# Patient Record
Sex: MALE | Race: ASIAN | NOT HISPANIC OR LATINO | URBAN - METROPOLITAN AREA
[De-identification: names, ages, dates, MRNs, and addresses within clinical notes are randomized per-mention and may not be internally consistent; named-entity substitution may affect disease eponyms.]

---

## 2017-01-13 ENCOUNTER — AMBULATORY - HEALTHEAST (OUTPATIENT)
Dept: LAB | Facility: CLINIC | Age: 32
End: 2017-01-13

## 2017-01-13 DIAGNOSIS — H20.13 CHRONIC UVEITIS OF BOTH EYES: ICD-10-CM

## 2017-01-13 LAB
ALT SERPL W P-5'-P-CCNC: 17 U/L (ref 0–45)
CREAT SERPL-MCNC: 0.75 MG/DL (ref 0.7–1.3)
GFR SERPL CREATININE-BSD FRML MDRD: >60 ML/MIN/1.73M2

## 2017-01-14 ENCOUNTER — COMMUNICATION - HEALTHEAST (OUTPATIENT)
Dept: RHEUMATOLOGY | Facility: CLINIC | Age: 32
End: 2017-01-14

## 2017-01-14 DIAGNOSIS — H20.13 CHRONIC UVEITIS OF BOTH EYES: ICD-10-CM

## 2017-01-17 ENCOUNTER — OFFICE VISIT - HEALTHEAST (OUTPATIENT)
Dept: RHEUMATOLOGY | Facility: CLINIC | Age: 32
End: 2017-01-17

## 2017-01-17 DIAGNOSIS — Z79.899 HIGH RISK MEDICATION USE: ICD-10-CM

## 2017-01-17 DIAGNOSIS — H20.13 CHRONIC UVEITIS OF BOTH EYES: ICD-10-CM

## 2017-01-17 ASSESSMENT — MIFFLIN-ST. JEOR: SCORE: 2141.25

## 2017-02-17 ENCOUNTER — AMBULATORY - HEALTHEAST (OUTPATIENT)
Dept: LAB | Facility: CLINIC | Age: 32
End: 2017-02-17

## 2017-02-17 DIAGNOSIS — H20.13 CHRONIC UVEITIS OF BOTH EYES: ICD-10-CM

## 2017-02-17 LAB
ALT SERPL W P-5'-P-CCNC: 22 U/L (ref 0–45)
CREAT SERPL-MCNC: 0.71 MG/DL (ref 0.7–1.3)
GFR SERPL CREATININE-BSD FRML MDRD: >60 ML/MIN/1.73M2

## 2017-03-17 ENCOUNTER — AMBULATORY - HEALTHEAST (OUTPATIENT)
Dept: LAB | Facility: CLINIC | Age: 32
End: 2017-03-17

## 2017-03-17 ENCOUNTER — COMMUNICATION - HEALTHEAST (OUTPATIENT)
Dept: LAB | Facility: CLINIC | Age: 32
End: 2017-03-17

## 2017-03-17 DIAGNOSIS — H20.13 CHRONIC UVEITIS OF BOTH EYES: ICD-10-CM

## 2017-03-20 LAB
ALT SERPL W P-5'-P-CCNC: 41 U/L (ref 0–45)
CREAT SERPL-MCNC: 0.71 MG/DL (ref 0.7–1.3)
GFR SERPL CREATININE-BSD FRML MDRD: >60 ML/MIN/1.73M2

## 2017-04-07 ENCOUNTER — COMMUNICATION - HEALTHEAST (OUTPATIENT)
Dept: RHEUMATOLOGY | Facility: CLINIC | Age: 32
End: 2017-04-07

## 2017-04-07 DIAGNOSIS — H20.13 CHRONIC UVEITIS OF BOTH EYES: ICD-10-CM

## 2017-04-26 ENCOUNTER — RECORDS - HEALTHEAST (OUTPATIENT)
Dept: ADMINISTRATIVE | Facility: OTHER | Age: 32
End: 2017-04-26

## 2017-05-15 ENCOUNTER — AMBULATORY - HEALTHEAST (OUTPATIENT)
Dept: LAB | Facility: CLINIC | Age: 32
End: 2017-05-15

## 2017-05-15 DIAGNOSIS — H20.13 CHRONIC UVEITIS OF BOTH EYES: ICD-10-CM

## 2017-05-15 LAB
ALT SERPL W P-5'-P-CCNC: 19 U/L (ref 0–45)
CREAT SERPL-MCNC: 0.72 MG/DL (ref 0.7–1.3)
GFR SERPL CREATININE-BSD FRML MDRD: >60 ML/MIN/1.73M2

## 2017-05-17 ENCOUNTER — OFFICE VISIT - HEALTHEAST (OUTPATIENT)
Dept: RHEUMATOLOGY | Facility: CLINIC | Age: 32
End: 2017-05-17

## 2017-05-17 DIAGNOSIS — H20.13 CHRONIC UVEITIS OF BOTH EYES: ICD-10-CM

## 2017-05-17 DIAGNOSIS — Z79.899 HIGH RISK MEDICATION USE: ICD-10-CM

## 2017-06-07 ENCOUNTER — COMMUNICATION - HEALTHEAST (OUTPATIENT)
Dept: RHEUMATOLOGY | Facility: CLINIC | Age: 32
End: 2017-06-07

## 2017-06-07 DIAGNOSIS — H20.13 CHRONIC UVEITIS OF BOTH EYES: ICD-10-CM

## 2017-07-21 ENCOUNTER — AMBULATORY - HEALTHEAST (OUTPATIENT)
Dept: LAB | Facility: CLINIC | Age: 32
End: 2017-07-21

## 2017-07-21 DIAGNOSIS — H20.13 CHRONIC UVEITIS OF BOTH EYES: ICD-10-CM

## 2017-07-21 LAB
ALT SERPL W P-5'-P-CCNC: 21 U/L (ref 0–45)
CREAT SERPL-MCNC: 0.73 MG/DL (ref 0.7–1.3)
GFR SERPL CREATININE-BSD FRML MDRD: >60 ML/MIN/1.73M2

## 2017-08-23 ENCOUNTER — RECORDS - HEALTHEAST (OUTPATIENT)
Dept: ADMINISTRATIVE | Facility: OTHER | Age: 32
End: 2017-08-23

## 2017-09-25 ENCOUNTER — COMMUNICATION - HEALTHEAST (OUTPATIENT)
Dept: RHEUMATOLOGY | Facility: CLINIC | Age: 32
End: 2017-09-25

## 2017-09-25 DIAGNOSIS — H20.13 CHRONIC UVEITIS OF BOTH EYES: ICD-10-CM

## 2017-09-27 ENCOUNTER — AMBULATORY - HEALTHEAST (OUTPATIENT)
Dept: LAB | Facility: CLINIC | Age: 32
End: 2017-09-27

## 2017-09-27 DIAGNOSIS — H20.13 CHRONIC UVEITIS OF BOTH EYES: ICD-10-CM

## 2017-09-27 LAB
ALT SERPL W P-5'-P-CCNC: 15 U/L (ref 0–45)
CREAT SERPL-MCNC: 0.74 MG/DL (ref 0.7–1.3)
GFR SERPL CREATININE-BSD FRML MDRD: >60 ML/MIN/1.73M2

## 2017-11-10 ENCOUNTER — AMBULATORY - HEALTHEAST (OUTPATIENT)
Dept: LAB | Facility: CLINIC | Age: 32
End: 2017-11-10

## 2017-11-10 DIAGNOSIS — H20.13 CHRONIC UVEITIS OF BOTH EYES: ICD-10-CM

## 2017-11-10 LAB
ALT SERPL W P-5'-P-CCNC: 24 U/L (ref 0–45)
CREAT SERPL-MCNC: 0.72 MG/DL (ref 0.7–1.3)
GFR SERPL CREATININE-BSD FRML MDRD: >60 ML/MIN/1.73M2

## 2017-11-15 ENCOUNTER — OFFICE VISIT - HEALTHEAST (OUTPATIENT)
Dept: RHEUMATOLOGY | Facility: CLINIC | Age: 32
End: 2017-11-15

## 2017-11-15 DIAGNOSIS — Z79.899 HIGH RISK MEDICATION USE: ICD-10-CM

## 2017-11-15 DIAGNOSIS — H20.13 CHRONIC UVEITIS OF BOTH EYES: ICD-10-CM

## 2017-11-15 RX ORDER — PREDNISOLONE ACETATE 10 MG/ML
1 SUSPENSION/ DROPS OPHTHALMIC DAILY
Status: SHIPPED | COMMUNITY
Start: 2017-09-05

## 2017-11-15 ASSESSMENT — MIFFLIN-ST. JEOR: SCORE: 2081.37

## 2017-12-20 ENCOUNTER — RECORDS - HEALTHEAST (OUTPATIENT)
Dept: ADMINISTRATIVE | Facility: OTHER | Age: 32
End: 2017-12-20

## 2017-12-28 ENCOUNTER — COMMUNICATION - HEALTHEAST (OUTPATIENT)
Dept: RHEUMATOLOGY | Facility: CLINIC | Age: 32
End: 2017-12-28

## 2017-12-28 DIAGNOSIS — H20.13 CHRONIC UVEITIS OF BOTH EYES: ICD-10-CM

## 2018-01-26 ENCOUNTER — COMMUNICATION - HEALTHEAST (OUTPATIENT)
Dept: RHEUMATOLOGY | Facility: CLINIC | Age: 33
End: 2018-01-26

## 2018-01-26 DIAGNOSIS — H20.13 CHRONIC UVEITIS OF BOTH EYES: ICD-10-CM

## 2018-02-08 ENCOUNTER — AMBULATORY - HEALTHEAST (OUTPATIENT)
Dept: LAB | Facility: CLINIC | Age: 33
End: 2018-02-08

## 2018-02-08 DIAGNOSIS — H20.13 CHRONIC UVEITIS OF BOTH EYES: ICD-10-CM

## 2018-02-08 LAB
ALBUMIN SERPL-MCNC: 3.6 G/DL (ref 3.5–5)
ALT SERPL W P-5'-P-CCNC: 17 U/L (ref 0–45)
CREAT SERPL-MCNC: 0.68 MG/DL (ref 0.7–1.3)
ERYTHROCYTE [DISTWIDTH] IN BLOOD BY AUTOMATED COUNT: 11.8 % (ref 11–14.5)
GFR SERPL CREATININE-BSD FRML MDRD: >60 ML/MIN/1.73M2
HCT VFR BLD AUTO: 45.8 % (ref 40–54)
HGB BLD-MCNC: 15.7 G/DL (ref 14–18)
MCH RBC QN AUTO: 32.1 PG (ref 27–34)
MCHC RBC AUTO-ENTMCNC: 34.2 G/DL (ref 32–36)
MCV RBC AUTO: 94 FL (ref 80–100)
PLATELET # BLD AUTO: 316 THOU/UL (ref 140–440)
PMV BLD AUTO: 6.9 FL (ref 7–10)
RBC # BLD AUTO: 4.87 MILL/UL (ref 4.4–6.2)
WBC: 8 THOU/UL (ref 4–11)

## 2018-03-05 ENCOUNTER — COMMUNICATION - HEALTHEAST (OUTPATIENT)
Dept: RHEUMATOLOGY | Facility: CLINIC | Age: 33
End: 2018-03-05

## 2018-03-05 DIAGNOSIS — H20.13 CHRONIC UVEITIS OF BOTH EYES: ICD-10-CM

## 2018-05-24 ENCOUNTER — COMMUNICATION - HEALTHEAST (OUTPATIENT)
Dept: RHEUMATOLOGY | Facility: CLINIC | Age: 33
End: 2018-05-24

## 2018-05-24 DIAGNOSIS — H20.13 CHRONIC UVEITIS OF BOTH EYES: ICD-10-CM

## 2018-05-29 ENCOUNTER — AMBULATORY - HEALTHEAST (OUTPATIENT)
Dept: LAB | Facility: CLINIC | Age: 33
End: 2018-05-29

## 2018-05-29 ENCOUNTER — COMMUNICATION - HEALTHEAST (OUTPATIENT)
Dept: LAB | Facility: CLINIC | Age: 33
End: 2018-05-29

## 2018-05-29 DIAGNOSIS — H20.13 CHRONIC UVEITIS OF BOTH EYES: ICD-10-CM

## 2018-05-29 LAB
ALBUMIN SERPL-MCNC: 3.7 G/DL (ref 3.5–5)
ALT SERPL W P-5'-P-CCNC: 23 U/L (ref 0–45)
CREAT SERPL-MCNC: 0.73 MG/DL (ref 0.7–1.3)
ERYTHROCYTE [DISTWIDTH] IN BLOOD BY AUTOMATED COUNT: 11 % (ref 11–14.5)
GFR SERPL CREATININE-BSD FRML MDRD: >60 ML/MIN/1.73M2
HCT VFR BLD AUTO: 46.1 % (ref 40–54)
HGB BLD-MCNC: 15.5 G/DL (ref 14–18)
MCH RBC QN AUTO: 32.1 PG (ref 27–34)
MCHC RBC AUTO-ENTMCNC: 33.5 G/DL (ref 32–36)
MCV RBC AUTO: 96 FL (ref 80–100)
PLATELET # BLD AUTO: 315 THOU/UL (ref 140–440)
PMV BLD AUTO: 6.7 FL (ref 7–10)
RBC # BLD AUTO: 4.82 MILL/UL (ref 4.4–6.2)
WBC: 9.6 THOU/UL (ref 4–11)

## 2018-06-13 ENCOUNTER — RECORDS - HEALTHEAST (OUTPATIENT)
Dept: ADMINISTRATIVE | Facility: OTHER | Age: 33
End: 2018-06-13

## 2018-06-22 ENCOUNTER — AMBULATORY - HEALTHEAST (OUTPATIENT)
Dept: LAB | Facility: CLINIC | Age: 33
End: 2018-06-22

## 2018-06-22 DIAGNOSIS — H20.13 CHRONIC UVEITIS OF BOTH EYES: ICD-10-CM

## 2018-06-22 LAB
ALBUMIN SERPL-MCNC: 3.8 G/DL (ref 3.5–5)
ALT SERPL W P-5'-P-CCNC: 21 U/L (ref 0–45)
CREAT SERPL-MCNC: 0.73 MG/DL (ref 0.7–1.3)
ERYTHROCYTE [DISTWIDTH] IN BLOOD BY AUTOMATED COUNT: 11 % (ref 11–14.5)
GFR SERPL CREATININE-BSD FRML MDRD: >60 ML/MIN/1.73M2
HCT VFR BLD AUTO: 46 % (ref 40–54)
HGB BLD-MCNC: 15.5 G/DL (ref 14–18)
MCH RBC QN AUTO: 32 PG (ref 27–34)
MCHC RBC AUTO-ENTMCNC: 33.7 G/DL (ref 32–36)
MCV RBC AUTO: 95 FL (ref 80–100)
PLATELET # BLD AUTO: 337 THOU/UL (ref 140–440)
PMV BLD AUTO: 6.5 FL (ref 7–10)
RBC # BLD AUTO: 4.84 MILL/UL (ref 4.4–6.2)
WBC: 8.5 THOU/UL (ref 4–11)

## 2018-06-25 ENCOUNTER — OFFICE VISIT - HEALTHEAST (OUTPATIENT)
Dept: RHEUMATOLOGY | Facility: CLINIC | Age: 33
End: 2018-06-25

## 2018-06-25 DIAGNOSIS — Z79.899 HIGH RISK MEDICATION USE: ICD-10-CM

## 2018-06-25 DIAGNOSIS — H20.13 CHRONIC UVEITIS OF BOTH EYES: ICD-10-CM

## 2018-09-12 ENCOUNTER — RECORDS - HEALTHEAST (OUTPATIENT)
Dept: ADMINISTRATIVE | Facility: OTHER | Age: 33
End: 2018-09-12

## 2018-09-25 ENCOUNTER — AMBULATORY - HEALTHEAST (OUTPATIENT)
Dept: LAB | Facility: CLINIC | Age: 33
End: 2018-09-25

## 2018-09-25 DIAGNOSIS — H20.13 CHRONIC UVEITIS OF BOTH EYES: ICD-10-CM

## 2018-09-25 LAB
ALBUMIN SERPL-MCNC: 3.7 G/DL (ref 3.5–5)
ALT SERPL W P-5'-P-CCNC: 28 U/L (ref 0–45)
CREAT SERPL-MCNC: 0.65 MG/DL (ref 0.7–1.3)
ERYTHROCYTE [DISTWIDTH] IN BLOOD BY AUTOMATED COUNT: 11.2 % (ref 11–14.5)
GFR SERPL CREATININE-BSD FRML MDRD: >60 ML/MIN/1.73M2
HCT VFR BLD AUTO: 43.9 % (ref 40–54)
HGB BLD-MCNC: 15.3 G/DL (ref 14–18)
MCH RBC QN AUTO: 32.7 PG (ref 27–34)
MCHC RBC AUTO-ENTMCNC: 35 G/DL (ref 32–36)
MCV RBC AUTO: 93 FL (ref 80–100)
PLATELET # BLD AUTO: 360 THOU/UL (ref 140–440)
PMV BLD AUTO: 6.8 FL (ref 7–10)
RBC # BLD AUTO: 4.69 MILL/UL (ref 4.4–6.2)
WBC: 8.7 THOU/UL (ref 4–11)

## 2018-10-24 ENCOUNTER — COMMUNICATION - HEALTHEAST (OUTPATIENT)
Dept: RHEUMATOLOGY | Facility: CLINIC | Age: 33
End: 2018-10-24

## 2018-10-24 DIAGNOSIS — H20.13 CHRONIC UVEITIS OF BOTH EYES: ICD-10-CM

## 2018-12-17 ENCOUNTER — AMBULATORY - HEALTHEAST (OUTPATIENT)
Dept: LAB | Facility: CLINIC | Age: 33
End: 2018-12-17

## 2018-12-17 DIAGNOSIS — H20.13 CHRONIC UVEITIS OF BOTH EYES: ICD-10-CM

## 2018-12-17 LAB
ALBUMIN SERPL-MCNC: 3.8 G/DL (ref 3.5–5)
ALT SERPL W P-5'-P-CCNC: 23 U/L (ref 0–45)
CREAT SERPL-MCNC: 0.69 MG/DL (ref 0.7–1.3)
ERYTHROCYTE [DISTWIDTH] IN BLOOD BY AUTOMATED COUNT: 12.4 % (ref 11–14.5)
GFR SERPL CREATININE-BSD FRML MDRD: >60 ML/MIN/1.73M2
HCT VFR BLD AUTO: 44.8 % (ref 40–54)
HGB BLD-MCNC: 15.8 G/DL (ref 14–18)
MCH RBC QN AUTO: 32.9 PG (ref 27–34)
MCHC RBC AUTO-ENTMCNC: 35.2 G/DL (ref 32–36)
MCV RBC AUTO: 93 FL (ref 80–100)
PLATELET # BLD AUTO: 371 THOU/UL (ref 140–440)
PMV BLD AUTO: 6.7 FL (ref 7–10)
RBC # BLD AUTO: 4.79 MILL/UL (ref 4.4–6.2)
WBC: 8.6 THOU/UL (ref 4–11)

## 2018-12-19 ENCOUNTER — OFFICE VISIT - HEALTHEAST (OUTPATIENT)
Dept: RHEUMATOLOGY | Facility: CLINIC | Age: 33
End: 2018-12-19

## 2018-12-19 DIAGNOSIS — H20.13 CHRONIC UVEITIS OF BOTH EYES: ICD-10-CM

## 2018-12-19 DIAGNOSIS — Z79.899 HIGH RISK MEDICATION USE: ICD-10-CM

## 2019-01-09 ENCOUNTER — RECORDS - HEALTHEAST (OUTPATIENT)
Dept: ADMINISTRATIVE | Facility: OTHER | Age: 34
End: 2019-01-09

## 2019-04-15 ENCOUNTER — HOSPITAL ENCOUNTER (OUTPATIENT)
Dept: LAB | Age: 34
Setting detail: SPECIMEN
Discharge: HOME | End: 2019-04-15

## 2019-04-15 ENCOUNTER — AMBULATORY - HEALTHEAST (OUTPATIENT)
Dept: LAB | Facility: CLINIC | Age: 34
End: 2019-04-15

## 2019-04-15 DIAGNOSIS — H20.13 CHRONIC UVEITIS OF BOTH EYES: ICD-10-CM

## 2019-04-15 LAB
ALBUMIN SERPL-MCNC: 3.9 G/DL (ref 3.5–5)
ALT SERPL W P-5'-P-CCNC: 20 U/L (ref 0–45)
CREAT SERPL-MCNC: 0.73 MG/DL (ref 0.7–1.3)
ERYTHROCYTE [DISTWIDTH] IN BLOOD BY AUTOMATED COUNT: 10.4 % (ref 11–14.5)
GFR SERPL CREATININE-BSD FRML MDRD: >60 ML/MIN/1.73M2
HCT VFR BLD AUTO: 46.6 % (ref 40–54)
HGB BLD-MCNC: 15.9 G/DL (ref 14–18)
MCH RBC QN AUTO: 32.5 PG (ref 27–34)
MCHC RBC AUTO-ENTMCNC: 34.1 G/DL (ref 32–36)
MCV RBC AUTO: 95 FL (ref 80–100)
PLATELET # BLD AUTO: 399 THOU/UL (ref 140–440)
PMV BLD AUTO: 7 FL (ref 7–10)
RBC # BLD AUTO: 4.89 MILL/UL (ref 4.4–6.2)
WBC: 8.4 THOU/UL (ref 4–11)

## 2019-04-20 ENCOUNTER — COMMUNICATION - HEALTHEAST (OUTPATIENT)
Dept: RHEUMATOLOGY | Facility: CLINIC | Age: 34
End: 2019-04-20

## 2019-04-20 DIAGNOSIS — H20.13 CHRONIC UVEITIS OF BOTH EYES: ICD-10-CM

## 2019-05-08 ENCOUNTER — RECORDS - HEALTHEAST (OUTPATIENT)
Dept: ADMINISTRATIVE | Facility: OTHER | Age: 34
End: 2019-05-08

## 2019-06-11 ENCOUNTER — COMMUNICATION - HEALTHEAST (OUTPATIENT)
Dept: RHEUMATOLOGY | Facility: CLINIC | Age: 34
End: 2019-06-11

## 2019-06-11 DIAGNOSIS — H20.13 CHRONIC UVEITIS OF BOTH EYES: ICD-10-CM

## 2019-06-17 ENCOUNTER — AMBULATORY - HEALTHEAST (OUTPATIENT)
Dept: LAB | Facility: CLINIC | Age: 34
End: 2019-06-17

## 2019-06-17 DIAGNOSIS — H20.13 CHRONIC UVEITIS OF BOTH EYES: ICD-10-CM

## 2019-06-17 LAB
ALBUMIN SERPL-MCNC: 4.2 G/DL (ref 3.5–5)
ALT SERPL W P-5'-P-CCNC: 44 U/L (ref 0–45)
CREAT SERPL-MCNC: 0.74 MG/DL (ref 0.7–1.3)
ERYTHROCYTE [DISTWIDTH] IN BLOOD BY AUTOMATED COUNT: 11.3 % (ref 11–14.5)
GFR SERPL CREATININE-BSD FRML MDRD: >60 ML/MIN/1.73M2
HCT VFR BLD AUTO: 51.7 % (ref 40–54)
HGB BLD-MCNC: 17.2 G/DL (ref 14–18)
MCH RBC QN AUTO: 32.3 PG (ref 27–34)
MCHC RBC AUTO-ENTMCNC: 33.3 G/DL (ref 32–36)
MCV RBC AUTO: 97 FL (ref 80–100)
PLATELET # BLD AUTO: 362 THOU/UL (ref 140–440)
PMV BLD AUTO: 6.7 FL (ref 7–10)
RBC # BLD AUTO: 5.32 MILL/UL (ref 4.4–6.2)
WBC: 8.2 THOU/UL (ref 4–11)

## 2019-06-18 ENCOUNTER — OFFICE VISIT - HEALTHEAST (OUTPATIENT)
Dept: RHEUMATOLOGY | Facility: CLINIC | Age: 34
End: 2019-06-18

## 2019-06-18 DIAGNOSIS — H20.13 CHRONIC UVEITIS OF BOTH EYES: ICD-10-CM

## 2019-06-18 DIAGNOSIS — M77.12 LEFT LATERAL EPICONDYLITIS: ICD-10-CM

## 2019-06-18 DIAGNOSIS — Z79.899 HIGH RISK MEDICATION USE: ICD-10-CM

## 2019-09-04 ENCOUNTER — RECORDS - HEALTHEAST (OUTPATIENT)
Dept: ADMINISTRATIVE | Facility: OTHER | Age: 34
End: 2019-09-04

## 2020-01-08 ENCOUNTER — RECORDS - HEALTHEAST (OUTPATIENT)
Dept: ADMINISTRATIVE | Facility: OTHER | Age: 35
End: 2020-01-08

## 2020-02-03 ENCOUNTER — AMBULATORY - HEALTHEAST (OUTPATIENT)
Dept: LAB | Facility: CLINIC | Age: 35
End: 2020-02-03

## 2020-02-03 DIAGNOSIS — H20.13 CHRONIC UVEITIS OF BOTH EYES: ICD-10-CM

## 2020-02-03 LAB
ALBUMIN SERPL-MCNC: 4 G/DL (ref 3.5–5)
ALT SERPL W P-5'-P-CCNC: 25 U/L (ref 0–45)
CREAT SERPL-MCNC: 0.8 MG/DL (ref 0.7–1.3)
ERYTHROCYTE [DISTWIDTH] IN BLOOD BY AUTOMATED COUNT: 10.9 % (ref 11–14.5)
GFR SERPL CREATININE-BSD FRML MDRD: >60 ML/MIN/1.73M2
HCT VFR BLD AUTO: 46.1 % (ref 40–54)
HGB BLD-MCNC: 16 G/DL (ref 14–18)
MCH RBC QN AUTO: 33.4 PG (ref 27–34)
MCHC RBC AUTO-ENTMCNC: 34.7 G/DL (ref 32–36)
MCV RBC AUTO: 96 FL (ref 80–100)
PLATELET # BLD AUTO: 318 THOU/UL (ref 140–440)
PMV BLD AUTO: 7 FL (ref 7–10)
RBC # BLD AUTO: 4.79 MILL/UL (ref 4.4–6.2)
WBC: 9.1 THOU/UL (ref 4–11)

## 2020-02-14 ENCOUNTER — OFFICE VISIT - HEALTHEAST (OUTPATIENT)
Dept: RHEUMATOLOGY | Facility: CLINIC | Age: 35
End: 2020-02-14

## 2020-02-14 DIAGNOSIS — H20.13 CHRONIC UVEITIS OF BOTH EYES: ICD-10-CM

## 2020-02-14 DIAGNOSIS — Z79.899 HIGH RISK MEDICATION USE: ICD-10-CM

## 2020-02-14 ASSESSMENT — MIFFLIN-ST. JEOR: SCORE: 2149.41

## 2020-05-06 ENCOUNTER — RECORDS - HEALTHEAST (OUTPATIENT)
Dept: ADMINISTRATIVE | Facility: OTHER | Age: 35
End: 2020-05-06

## 2020-06-09 ENCOUNTER — AMBULATORY - HEALTHEAST (OUTPATIENT)
Dept: LAB | Facility: CLINIC | Age: 35
End: 2020-06-09

## 2020-06-09 DIAGNOSIS — H20.13 CHRONIC UVEITIS OF BOTH EYES: ICD-10-CM

## 2020-06-09 LAB
ALBUMIN SERPL-MCNC: 4 G/DL (ref 3.5–5)
ALT SERPL W P-5'-P-CCNC: 19 U/L (ref 0–45)
CREAT SERPL-MCNC: 0.73 MG/DL (ref 0.7–1.3)
ERYTHROCYTE [DISTWIDTH] IN BLOOD BY AUTOMATED COUNT: 11.2 % (ref 11–14.5)
GFR SERPL CREATININE-BSD FRML MDRD: >60 ML/MIN/1.73M2
HCT VFR BLD AUTO: 43.6 % (ref 40–54)
HGB BLD-MCNC: 15.2 G/DL (ref 14–18)
MCH RBC QN AUTO: 32.4 PG (ref 27–34)
MCHC RBC AUTO-ENTMCNC: 34.8 G/DL (ref 32–36)
MCV RBC AUTO: 93 FL (ref 80–100)
PLATELET # BLD AUTO: 335 THOU/UL (ref 140–440)
PMV BLD AUTO: 6.9 FL (ref 7–10)
RBC # BLD AUTO: 4.69 MILL/UL (ref 4.4–6.2)
WBC: 8.2 THOU/UL (ref 4–11)

## 2020-06-10 ENCOUNTER — COMMUNICATION - HEALTHEAST (OUTPATIENT)
Dept: RHEUMATOLOGY | Facility: CLINIC | Age: 35
End: 2020-06-10

## 2020-06-10 DIAGNOSIS — H20.13 CHRONIC UVEITIS OF BOTH EYES: ICD-10-CM

## 2020-09-09 ENCOUNTER — RECORDS - HEALTHEAST (OUTPATIENT)
Dept: ADMINISTRATIVE | Facility: OTHER | Age: 35
End: 2020-09-09

## 2020-10-14 ENCOUNTER — COMMUNICATION - HEALTHEAST (OUTPATIENT)
Dept: RHEUMATOLOGY | Facility: CLINIC | Age: 35
End: 2020-10-14

## 2020-10-14 DIAGNOSIS — H20.13 CHRONIC UVEITIS OF BOTH EYES: ICD-10-CM

## 2020-10-19 ENCOUNTER — AMBULATORY - HEALTHEAST (OUTPATIENT)
Dept: LAB | Facility: CLINIC | Age: 35
End: 2020-10-19

## 2020-10-19 DIAGNOSIS — H20.13 CHRONIC UVEITIS OF BOTH EYES: ICD-10-CM

## 2020-10-19 LAB
ALBUMIN SERPL-MCNC: 4.3 G/DL (ref 3.5–5)
ALT SERPL W P-5'-P-CCNC: 22 U/L (ref 0–45)
CREAT SERPL-MCNC: 0.72 MG/DL (ref 0.7–1.3)
ERYTHROCYTE [DISTWIDTH] IN BLOOD BY AUTOMATED COUNT: 11.3 % (ref 11–14.5)
GFR SERPL CREATININE-BSD FRML MDRD: >60 ML/MIN/1.73M2
HCT VFR BLD AUTO: 48.9 % (ref 40–54)
HGB BLD-MCNC: 16.5 G/DL (ref 14–18)
MCH RBC QN AUTO: 32.2 PG (ref 27–34)
MCHC RBC AUTO-ENTMCNC: 33.7 G/DL (ref 32–36)
MCV RBC AUTO: 96 FL (ref 80–100)
PLATELET # BLD AUTO: 343 THOU/UL (ref 140–440)
PMV BLD AUTO: 7 FL (ref 7–10)
RBC # BLD AUTO: 5.12 MILL/UL (ref 4.4–6.2)
WBC: 9.6 THOU/UL (ref 4–11)

## 2020-10-22 ENCOUNTER — OFFICE VISIT - HEALTHEAST (OUTPATIENT)
Dept: RHEUMATOLOGY | Facility: CLINIC | Age: 35
End: 2020-10-22

## 2020-10-22 DIAGNOSIS — Z79.899 HIGH RISK MEDICATION USE: ICD-10-CM

## 2020-10-22 DIAGNOSIS — H20.13 CHRONIC UVEITIS OF BOTH EYES: ICD-10-CM

## 2020-10-22 RX ORDER — AZATHIOPRINE 50 MG/1
50 TABLET ORAL 3 TIMES DAILY
Qty: 270 TABLET | Refills: 0 | Status: SHIPPED | OUTPATIENT
Start: 2020-10-22 | End: 2021-08-02

## 2021-03-17 ENCOUNTER — RECORDS - HEALTHEAST (OUTPATIENT)
Dept: ADMINISTRATIVE | Facility: OTHER | Age: 36
End: 2021-03-17

## 2021-05-29 NOTE — PROGRESS NOTES
ASSESSMENT AND PLAN:  Juancarlos Nicholas 33 y.o. male is here for follow-up.  He follows here for management of management of immunosuppression on azathioprine for bilateral uveitis, not associated with his systemic rheumatologic syndrome, no longer on prednisone, not started on methotrexate given the plans for family.  He is doing good on azathioprine was seen in ophthalmology and was told to as noted discontinue prednisone.  He noted left elbow pain which is consistent with lateral epicondylitis management principles were outlined.  Follow-up here in 6 months with labs every 3 recent labs are normal.          Diagnoses and all orders for this visit:    Chronic uveitis of both eyes  -     azaTHIOprine (IMURAN) 50 mg tablet; Take 1 tablet (50 mg total) by mouth 3 (three) times a day.  Dispense: 270 tablet; Refill: 0    Left lateral epicondylitis    High risk medication use      HISTORY OF PRESENTING ILLNESS:  Juancarlos Nicholas, 33 y.o., male  is here for follow-up of management of immunosuppression for his uveitis, bilateral, on azathioprine at 50 mg 3 times a day, has tolerated it well, recent labs are normal.  He is no longer on prednisone.  His inflammation has subsided according to his understanding of ophthalmology reviewed recently.  He has noted pain.  This is in his elbow.  This is left side.  In 3 weeks.  Worse with lifting certain weights in certain directions.  No history of trauma redness no rash there.  Does not wake him up from sleep.    He reports no mouth ulcers photosensitivity, he has not had pleuritic symptoms, no seizure disorder, no history of erythema nodosum-like symptoms, there is no history of TB in the family or himself, psoriasis, ulcerative colitis Crohn's disease, he describes no low back pain, joint pains.  His workup including HLA-B27, syphilis serology, were reviewed and negative.  He is a  from [facility].  Been here for 4 years.  They got  this past March.   He is a  the [employer].  Exercises regularly treadmill 1 hour day.  He is nonsmoker alcohol once in 3 months.  His father has hypertension.  No family history of autoimmune conditions that he is aware of. Further historical information and ADL limitations as noted in the multidimensional health assessment questionnaire attached in the EMR.       ALLERGIES:Patient has no known allergies.    PAST MEDICAL/ACTIVE PROBLEMS/MEDICATION/ FAMILY HISTORY/SOCIAL DATA:  The patient has a family history of  No past medical history on file.  Social History     Tobacco Use   Smoking Status Current Some Day Smoker   Smokeless Tobacco Never Used     Patient Active Problem List   Diagnosis     Chronic uveitis of both eyes     High risk medication use     Current Outpatient Medications   Medication Sig Dispense Refill     azaTHIOprine (IMURAN) 50 mg tablet TAKE 1 TABLET BY MOUTH THREE TIMES DAILY 270 tablet 0     prednisoLONE acetate (PRED-FORTE) 1 % ophthalmic suspension Administer 1 drop to both eyes daily.       azaTHIOprine (IMURAN) 50 mg tablet Take 1 tablet (50 mg total) by mouth 3 (three) times a day. 270 tablet 0     PREDNISONE ORAL Take 5 mg by mouth every other day .             No current facility-administered medications for this visit.      DETAILED EXAMINATION  06/18/19  :  Vitals:    06/18/19 1558   BP: 144/86   Patient Site: Right Arm   Patient Position: Sitting   Cuff Size: Adult Large   Pulse: (!) 104   Weight: (!) 246 lb (111.6 kg)     Alert oriented. Head including the face is examined for malar rash, heliotropes, scarring, lupus pernio. Eyes examined for redness such as in episcleritis/scleritis, periorbital lesions.   Neck/ Face examined for parotid gland swelling, range of motion of neck.  Left upper and lower and right upper and lower extremities examined for tenderness, swelling, warmth of the appendicular joints, range of motion, edema, rash.  Some of the important findings included: He does  not have synovitis in any of the palpable upper extremity joints no JLT the knees or ankles.  He has tenderness of the left lateral epicondyles reproducing his symptoms.  There is no dactylitis of digits.         Body mass index is 32.02 kg/m .   LAB / IMAGING DATA:  ALT   Date Value Ref Range Status   06/17/2019 44 0 - 45 U/L Final   04/15/2019 20 0 - 45 U/L Final   12/17/2018 23 0 - 45 U/L Final     Albumin   Date Value Ref Range Status   06/17/2019 4.2 3.5 - 5.0 g/dL Final   04/15/2019 3.9 3.5 - 5.0 g/dL Final   12/17/2018 3.8 3.5 - 5.0 g/dL Final     Creatinine   Date Value Ref Range Status   06/17/2019 0.74 0.70 - 1.30 mg/dL Final   04/15/2019 0.73 0.70 - 1.30 mg/dL Final   12/17/2018 0.69 (L) 0.70 - 1.30 mg/dL Final       WBC   Date Value Ref Range Status   06/17/2019 8.2 4.0 - 11.0 thou/uL Final   04/15/2019 8.4 4.0 - 11.0 thou/uL Final       Lab Results   Component Value Date    RF <15.0 02/10/2016    SEDRATE 27 (H) 02/10/2016

## 2021-05-30 VITALS — BODY MASS INDEX: 32.24 KG/M2 | HEIGHT: 74 IN | WEIGHT: 251.2 LBS

## 2021-05-30 VITALS — BODY MASS INDEX: 30.97 KG/M2 | WEIGHT: 238 LBS

## 2021-05-31 VITALS — BODY MASS INDEX: 30.54 KG/M2 | HEIGHT: 74 IN | WEIGHT: 238 LBS

## 2021-06-01 VITALS — WEIGHT: 252 LBS | BODY MASS INDEX: 32.8 KG/M2

## 2021-06-02 VITALS — WEIGHT: 251 LBS | BODY MASS INDEX: 32.67 KG/M2

## 2021-06-03 VITALS — WEIGHT: 246 LBS | BODY MASS INDEX: 32.02 KG/M2

## 2021-06-04 VITALS
WEIGHT: 253 LBS | SYSTOLIC BLOOD PRESSURE: 116 MMHG | DIASTOLIC BLOOD PRESSURE: 72 MMHG | BODY MASS INDEX: 32.47 KG/M2 | HEIGHT: 74 IN

## 2021-06-06 NOTE — PROGRESS NOTES
ASSESSMENT AND PLAN:  Juancarlos Nicholas 34 y.o. male is here for follow-up of management of immunosuppression for bilateral uveitis, on azathioprine 150 mg daily, recent labs within acceptable range, saw the ophthalmologist, recalls in January, he has been Monse discontinue topical steroid drops.  No features to suggest a systemic inflammatory/rheumatologic syndrome to contribute to his ocular symptoms.  We will meet here in 6 months.  Labs every 3             Diagnoses and all orders for this visit:    Chronic uveitis of both eyes  -     azaTHIOprine (IMURAN) 50 mg tablet; Take 1 tablet (50 mg total) by mouth 3 (three) times a day.  Dispense: 270 tablet; Refill: 0    High risk medication use      HISTORY OF PRESENTING ILLNESS:  Juancarlos Nicholas, 34 y.o., male  is here for follow-up of management of immunosuppression for his uveitis, bilateral, on azathioprine at 150 mg daily, has tolerated it well, recent labs are normal.  He is no longer on prednisone.  He reports being seen in ophthalmology in middle of January 2020.  He was informed to subside the inflammation is subsided to the point where he should stop taking the topical drops.  He noted no joint pains.  He has not had a rash.  No features suggestive of erythema nodosum.  There is no penile scrotal oral ulceration.  No personal family history of psoriasis ulcerative colitis Crohn's disease. there is no history of TB in the family or himself, psoriasis, ulcerative colitis Crohn's disease, he describes no low back pain, joint pains.  His workup including HLA-B27, syphilis serology, were reviewed and negative.  He is a  from [facility].  Been here for 4 years.  They got  this past March.  He is a  [employer].  Exercises regularly treadmill 1 hour day.  He is nonsmoker alcohol once in 3 months.  His father has hypertension.  No family history of autoimmune conditions that he is aware of. Further historical information  "and ADL limitations as noted in the multidimensional health assessment questionnaire attached in the EMR.       ALLERGIES:Patient has no known allergies.    PAST MEDICAL/ACTIVE PROBLEMS/MEDICATION/ FAMILY HISTORY/SOCIAL DATA:  The patient has a family history of  No past medical history on file.  Social History     Tobacco Use   Smoking Status Current Some Day Smoker   Smokeless Tobacco Never Used     Patient Active Problem List   Diagnosis     Chronic uveitis of both eyes     High risk medication use     Left lateral epicondylitis     Current Outpatient Medications   Medication Sig Dispense Refill     azaTHIOprine (IMURAN) 50 mg tablet Take 1 tablet (50 mg total) by mouth 3 (three) times a day. 270 tablet 0     prednisoLONE acetate (PRED-FORTE) 1 % ophthalmic suspension Administer 1 drop to both eyes daily.       PREDNISONE ORAL Take 5 mg by mouth every other day .             No current facility-administered medications for this visit.      DETAILED EXAMINATION  02/14/20  :  Vitals:    02/14/20 0737   BP: 116/72   Patient Site: Right Arm   Patient Position: Sitting   Cuff Size: Adult Regular   Weight: (!) 253 lb (114.8 kg)   Height: 6' 1.5\" (1.867 m)     Alert oriented. Head including the face is examined for malar rash, heliotropes, scarring, lupus pernio. Eyes examined for redness such as in episcleritis/scleritis, periorbital lesions.   Neck/ Face examined for parotid gland swelling, range of motion of neck.  Left upper and lower and right upper and lower extremities examined for tenderness, swelling, warmth of the appendicular joints, range of motion, edema, rash.  Some of the important findings included: He does not have synovitis of palpable joints of upper extremities no acneform lesions, he does not have chondritis such as ears, nose, no  erythema nodosum.  There is no dactylitis of the digits.            Body mass index is 32.93 kg/m .   LAB / IMAGING DATA:  ALT   Date Value Ref Range Status   02/03/2020 " 25 0 - 45 U/L Final   06/17/2019 44 0 - 45 U/L Final   04/15/2019 20 0 - 45 U/L Final     Albumin   Date Value Ref Range Status   02/03/2020 4.0 3.5 - 5.0 g/dL Final   06/17/2019 4.2 3.5 - 5.0 g/dL Final   04/15/2019 3.9 3.5 - 5.0 g/dL Final     Creatinine   Date Value Ref Range Status   02/03/2020 0.80 0.70 - 1.30 mg/dL Final   06/17/2019 0.74 0.70 - 1.30 mg/dL Final   04/15/2019 0.73 0.70 - 1.30 mg/dL Final       WBC   Date Value Ref Range Status   02/03/2020 9.1 4.0 - 11.0 thou/uL Final   06/17/2019 8.2 4.0 - 11.0 thou/uL Final       Lab Results   Component Value Date    RF <15.0 02/10/2016    SEDRATE 27 (H) 02/10/2016

## 2021-06-08 NOTE — PROGRESS NOTES
ASSESSMENT AND PLAN:  Juancarlos Nicholas 31 y.o. male  is here for management of immunosuppression for recurrent bilateral uveitis/Harada's disease.  He is still on substantial doses of prednisone at 20/10 mg on alt days.  He has undergone cataract surgery bilaterally.  It appears that his right inflammatory eye disease is under control.  Because of the reproductive aspect he is not on methotrexate.  I asked him to increase azathioprine to 50 mg 3 times daily.  His ophthalmologist is going to continue to reduce her prednisone.  EMS to get back to 0.  return for follow-up sooner this time in 4 months with labs every 4 weeks ×2 and then 2 months thereafter.    Diagnoses and all orders for this visit:    Chronic uveitis of both eyes  -     azaTHIOprine (IMURAN) 50 mg tablet; TAKE ONE TABLET BY MOUTH three / day  Dispense: 90 tablet; Refill: 1    High risk medication use      HISTORY OF PRESENTING ILLNESS:  Juancarlos Nicholas, 31 y.o., male is here for evaluation, management of her immunosuppressants for anterior uveitis.  And this started 6 months ago.  Bilateral.  He is having to take more local topical drops as well as oral prednisone.  Each time his prednisone dose is reduced he has really flare of uveitis.  This constitutes a combination of blurry vision, painful eyes, photosensitivity.  The eyes get red.  He has put on 15 pounds over it with prednisone.  His recent labs showed hyperglycemia.  He has family history of hypertension.  He reports no mouth ulcers photosensitivity, he has not had pleuritic symptoms, no seizure disorder, no history of erythema nodosum-like symptoms, there is no history of TB in the family or himself, psoriasis, ulcerative colitis Crohn's disease, he describes no low back pain, joint pains.  His workup including HLA-B27, syphilis serology, were reviewed and negative.  He is a  from [facility].  Been here for 4 years.  They got  this past March.  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  Exercises regularly treadmill 1 hour day.  He is nonsmoker alcohol once in 3 months.  His father has hypertension.  No family history of autoimmune conditions that he is aware of. Further historical information and ADL limitations as noted in the multidimensional health assessment questionnaire attached in the EMR.       ALLERGIES:Review of patient's allergies indicates no known allergies.    PAST MEDICAL/ACTIVE PROBLEMS/MEDICATION/ FAMILY HISTORY/SOCIAL DATA:  The patient has a family history of  No past medical history on file.  History   Smoking Status     Former Smoker   Smokeless Tobacco     Never Used     Patient Active Problem List   Diagnosis     Chronic uveitis of both eyes     High risk medication use     Current Outpatient Prescriptions   Medication Sig Dispense Refill     azaTHIOprine (IMURAN) 50 mg tablet TAKE ONE TABLET BY MOUTH TWICE DAILY 60 tablet 0     difluprednate 0.05 % Drop Apply to eye 4 (four) times a day.       predniSONE (DELTASONE) 20 MG tablet Take 20 mg by mouth daily. Alternate days 10mg and 20mg       No current facility-administered medications for this visit.          DETAILED EXAMINATION:  Vitals:    01/17/17 1316   BP: 120/78   Patient Site: Left Arm   Patient Position: Sitting   Cuff Size: Adult Regular   Pulse: 64   Weight: (!) 251 lb 3.2 oz (113.9 kg)   Height: 6' 1.5\" (1.867 m)    Comfortable.  Alert oriented.  Eyes are without inflammatory changes.  Examination of both upper and lower extremities is performed for swollen & tender joints, range of motion, rash, weakness, discoloration, warmth, swelling.  The skin examined for nodules. The salient normal / abnormal findings are appended.   Apart from his obesity, no  synovitis in any of the palpable appendicular joints, erythema nodosum, nail pitting, psoriatic lesions.  Body mass index is 32.69 kg/(m^2).   LAB / IMAGING DATA:  ALT   Date Value Ref Range Status   01/13/2017 17 0 - 45 U/L Final   11/09/2016 " 22 0 - 45 U/L Final   09/30/2016 12 0 - 45 U/L Final     ALBUMIN   Date Value Ref Range Status   01/13/2017 3.7 3.5 - 5.0 g/dL Final   11/09/2016 3.5 3.5 - 5.0 g/dL Final   09/30/2016 3.8 3.5 - 5.0 g/dL Final     CREATININE   Date Value Ref Range Status   01/13/2017 0.75 0.70 - 1.30 mg/dL Final   11/09/2016 0.75 0.70 - 1.30 mg/dL Final   09/30/2016 0.69 (L) 0.70 - 1.30 mg/dL Final       WBC   Date Value Ref Range Status   01/13/2017 10.6 4.0 - 11.0 thou/uL Final   11/09/2016 13.1 (H) 4.0 - 11.0 thou/uL Final       Lab Results   Component Value Date    RF <15.0 02/10/2016    SEDRATE 27 (H) 02/10/2016

## 2021-06-10 NOTE — PROGRESS NOTES
ASSESSMENT AND PLAN:  Juancarlos Nicholas 31 y.o. male is here for management of immunosuppression for recurrent bilateral uveitis/Harada's disease.  He is reducing the dose of prednisone per ophthalmology and going down to 5 mg alternating with 10.  He has undergone cataract surgery bilaterally.  It appears that his inflammatory eye disease is under control.  Because of the reproductive aspect he is not on methotrexate. His ophthalmologist is going to continue to reduce her prednisone.  Return for follow-up in 6 months labs every 3 months.   Diagnoses and all orders for this visit:    Chronic uveitis of both eyes  -     azaTHIOprine (IMURAN) 50 mg tablet; TAKE ONE TABLET BY MOUTH THREE TIMES DAILY  Dispense: 270 tablet; Refill: 0    High risk medication use      HISTORY OF PRESENTING ILLNESS:  Juancarlos Nicholas, 31 y.o., male  is here for evaluation, management of her immunosuppressants for anterior uveitis.  And this started 6 months ago.  Bilateral.  He is having to take more local topical drops as well as oral prednisone.  Each time his prednisone dose is reduced he has really flare of uveitis.  This constitutes a combination of blurry vision, painful eyes, photosensitivity.  The eyes get red.  He has put on 15 pounds over it with prednisone.  His recent labs showed hyperglycemia.  He has family history of hypertension.  He reports no mouth ulcers photosensitivity, he has not had pleuritic symptoms, no seizure disorder, no history of erythema nodosum-like symptoms, there is no history of TB in the family or himself, psoriasis, ulcerative colitis Crohn's disease, he describes no low back pain, joint pains.  His workup including HLA-B27, syphilis serology, were reviewed and negative.  He is a  from [facility].  Been here for 4 years.  They got  this past March.  He is a  [employer].  Exercises regularly treadmill 1 hour day.  He is nonsmoker alcohol once in 3 months.  His  father has hypertension.  No family history of autoimmune conditions that he is aware of. Further historical information and ADL limitations as noted in the multidimensional health assessment questionnaire attached in the EMR.       ALLERGIES:Review of patient's allergies indicates no known allergies.    PAST MEDICAL/ACTIVE PROBLEMS/MEDICATION/ FAMILY HISTORY/SOCIAL DATA:  The patient has a family history of  No past medical history on file.  History   Smoking Status     Former Smoker   Smokeless Tobacco     Never Used     Patient Active Problem List   Diagnosis     Chronic uveitis of both eyes     High risk medication use     Current Outpatient Prescriptions   Medication Sig Dispense Refill     azaTHIOprine (IMURAN) 50 mg tablet TAKE ONE TABLET BY MOUTH THREE TIMES DAILY 90 tablet 1     difluprednate 0.05 % Drop Apply to eye 2 (two) times a day.        predniSONE (DELTASONE) 20 MG tablet Take 10 mg by mouth daily.        No current facility-administered medications for this visit.        DETAILED EXAMINATION  05/17/17  :  Vitals:    05/17/17 1308   BP: 136/78   Pulse: 72   Weight: (!) 238 lb (108 kg)     Alert oriented. Head including the face is examined for malar rash, heliotropes, scarring, lupus pernio. Eyes examined for redness such as in episcleritis/scleritis, periorbital lesions.   Neck examined  for lymph nodes, range of motion Both upper and lower extremities (all four) examined for swollen, warm &/or  tender joints, range of motion, rash, muscle weakness, edema. The salient normal / abnormal findings are appended.     Apart from his obesity, no  synovitis in any of the palpable appendicular joints, erythema nodosum, nail pitting, psoriatic lesions.   Body mass index is 30.97 kg/(m^2).   LAB / IMAGING DATA:  ALT   Date Value Ref Range Status   05/15/2017 19 0 - 45 U/L Final   03/17/2017 41 0 - 45 U/L Final   02/17/2017 22 0 - 45 U/L Final     Albumin   Date Value Ref Range Status   05/15/2017 3.6 3.5 -  5.0 g/dL Final   03/17/2017 3.6 3.5 - 5.0 g/dL Final   02/17/2017 3.6 3.5 - 5.0 g/dL Final     Creatinine   Date Value Ref Range Status   05/15/2017 0.72 0.70 - 1.30 mg/dL Final   03/17/2017 0.71 0.70 - 1.30 mg/dL Final   02/17/2017 0.71 0.70 - 1.30 mg/dL Final       WBC   Date Value Ref Range Status   05/15/2017 10.1 4.0 - 11.0 thou/uL Final   03/17/2017 11.5 (H) 4.0 - 11.0 thou/uL Final       Lab Results   Component Value Date    RF <15.0 02/10/2016    SEDRATE 27 (H) 02/10/2016

## 2021-06-12 NOTE — PROGRESS NOTES
"Juancarlos Nicholas is a 35 y.o. male who is being evaluated via a billable video visit.      The patient has been notified of following:     \"This video visit will be conducted via a call between you and your physician/provider. We have found that certain health care needs can be provided without the need for an in-person physical exam.  This service lets us provide the care you need with a video conversation.  If a prescription is necessary we can send it directly to your pharmacy.  If lab work is needed we can place an order for that and you can then stop by our lab to have the test done at a later time.    Video visits are billed at different rates depending on your insurance coverage. Please reach out to your insurance provider with any questions.    If during the course of the call the physician/provider feels a video visit is not appropriate, you will not be charged for this service.\"    Patient has given verbal consent to a Video visit? Yes  How would you like to obtain your AVS? AVS Preference: Transfer To.  If dropped by the video visit, the video invitation should be sent to: Text to cell phone: [email or web address]/ [phone]  Will anyone else be joining your video visit? No        Video-Visit Details    Type of service:  Video Visit    Originating Location (pt. Location): Home    Distant Location (provider location):  [facility]     Platform used for Video Visit: Kitani      ASSESSMENT AND PLAN:    Diagnoses and all orders for this visit:    High risk medication use    Chronic uveitis of both eyes  -     azaTHIOprine (IMURAN) 50 mg tablet; Take 1 tablet (50 mg total) by mouth 3 (three) times a day.  Dispense: 270 tablet; Refill: 0          HISTORY OF PRESENTING ILLNESS:  Juancarlos Nicholas 35 y.o. is evaluated here via video link.  Is here for follow-up.  This is for management of immunosuppression.  This is prescribed for uveitis, bilateral.  This is now well controlled with azathioprine 150 mg " daily.  He has tolerated this well.  He was seen in ophthalmology recently.  The plan is for him to consider tapering azathioprine around next year.  He has not had a flareup during this time he reports no painful joints.  His recent labs are within normal range.   He has not had a rash.  No features suggestive of erythema nodosum.  There is no penile scrotal oral ulceration.  No personal family history of psoriasis ulcerative colitis Crohn's disease. there is no history of TB in the family or himself, psoriasis, ulcerative colitis Crohn's disease, he describes no low back pain, joint pains.  His workup including HLA-B27, syphilis serology, were reviewed and negative.  He is a  from [facility].  Been here for 4 years.  They got  this past March.  He is a  [employer].  Exercises regularly treadmill 1 hour day.  He is nonsmoker alcohol once in 3 months.  His father has hypertension.  No family history of autoimmune conditions that he is aware of.  We will continue azathioprine as now, follow-up in 6 months.  This is follow up visit for    ROS enquiry held for fever, ocular symptoms, rash, headache,  GI issues.  Today we also discussed the issues related to the current pandemic, the pros and cons of the current treatment plan, the CDC guidelines such as social distancing washing the hands covering the cough.  ALLERGIES:Patient has no known allergies.    PAST MEDICAL/ACTIVE PROBLEMS/MEDICATION/SOCIAL DATA  No past medical history on file.  Social History     Tobacco Use   Smoking Status Current Some Day Smoker   Smokeless Tobacco Never Used     Patient Active Problem List   Diagnosis     Chronic uveitis of both eyes     High risk medication use     Current Outpatient Medications   Medication Sig Dispense Refill     azaTHIOprine (IMURAN) 50 mg tablet TAKE 1 TABLET BY MOUTH THREE TIMES DAILY 270 tablet 0     prednisoLONE acetate (PRED-FORTE) 1 % ophthalmic suspension  Administer 1 drop to both eyes daily.       No current facility-administered medications for this visit.          EXAMINATION:    Using the audio and video link as best as possible the constitutional, neck, neurologic, psych, skin, both upper extremities areas/organ system were evaluated during this assessment.  Some of the important findings: Alert, oriented, speech fluent.   Able to fully flex the digits, into fists bilaterally, wrist and elbow elbow range of motion appear normal, abduction of the shoulder is normal.      LAB / IMAGING DATA:  ALT   Date Value Ref Range Status   10/19/2020 22 0 - 45 U/L Final   06/09/2020 19 0 - 45 U/L Final   02/03/2020 25 0 - 45 U/L Final     Albumin   Date Value Ref Range Status   10/19/2020 4.3 3.5 - 5.0 g/dL Final   06/09/2020 4.0 3.5 - 5.0 g/dL Final   02/03/2020 4.0 3.5 - 5.0 g/dL Final     Creatinine   Date Value Ref Range Status   10/19/2020 0.72 0.70 - 1.30 mg/dL Final   06/09/2020 0.73 0.70 - 1.30 mg/dL Final   02/03/2020 0.80 0.70 - 1.30 mg/dL Final       WBC   Date Value Ref Range Status   10/19/2020 9.6 4.0 - 11.0 thou/uL Final   06/09/2020 8.2 4.0 - 11.0 thou/uL Final     Hemoglobin   Date Value Ref Range Status   10/19/2020 16.5 14.0 - 18.0 g/dL Final   06/09/2020 15.2 14.0 - 18.0 g/dL Final   02/03/2020 16.0 14.0 - 18.0 g/dL Final     Platelets   Date Value Ref Range Status   10/19/2020 343 140 - 440 thou/uL Final   06/09/2020 335 140 - 440 thou/uL Final   02/03/2020 318 140 - 440 thou/uL Final       Lab Results   Component Value Date    RF <15.0 02/10/2016    SEDRATE 27 (H) 02/10/2016     Start: 10/22/2020 11:19 am   Stop: 10/22/2020 11:24 am

## 2021-06-14 NOTE — PROGRESS NOTES
ASSESSMENT AND PLAN:  Juancarlos Nicholas 32 y.o. male is here for management of immunosuppression.  He is on azathioprine for bilateral uveitis.  He is not on methotrexate because of the reproductive aspects.  He has been to ophthalmology.  They will ask him to stay on 5 mg of prednisone yet.  There was evidently no features to suggest active inflammation.  His recent labs are normal.  I have asked him to return for follow-up here in 6 months.  Labs every 3 months now.      Diagnoses and all orders for this visit:    Chronic uveitis of both eyes    High risk medication use    HISTORY OF PRESENTING ILLNESS:  Juancarlos Nicholas, 32 y.o., male  is here for evaluation, management of her immunosuppressants for anterior uveitis.  This is bilateral.  Currently he is on azathioprine.  He is tolerated this nicely.  Recent labs are within normal range.  He is having to take more local topical drops as well as oral prednisone.  He had first, and he had reported that each time his prednisone dose is reduced he has really flare of uveitis.  This constitutes a combination of blurry vision, painful eyes, photosensitivity.  The eyes get red.  He has put on 15 pounds over it with prednisone.  His recent labs showed hyperglycemia.  He has family history of hypertension.  He reports no mouth ulcers photosensitivity, he has not had pleuritic symptoms, no seizure disorder, no history of erythema nodosum-like symptoms, there is no history of TB in the family or himself, psoriasis, ulcerative colitis Crohn's disease, he describes no low back pain, joint pains.  His workup including HLA-B27, syphilis serology, were reviewed and negative.  He is a  from [facility].  Been here for 4 years.  They got  this past March.  He is a  the [employer].  Exercises regularly treadmill 1 hour day.  He is nonsmoker alcohol once in 3 months.  His father has hypertension.  No family history of autoimmune conditions  "that he is aware of. Further historical information and ADL limitations as noted in the multidimensional health assessment questionnaire attached in the EMR.       ALLERGIES:Review of patient's allergies indicates no known allergies.    PAST MEDICAL/ACTIVE PROBLEMS/MEDICATION/ FAMILY HISTORY/SOCIAL DATA:  The patient has a family history of  No past medical history on file.  History   Smoking Status     Former Smoker   Smokeless Tobacco     Never Used     Patient Active Problem List   Diagnosis     Chronic uveitis of both eyes     High risk medication use     Current Outpatient Prescriptions   Medication Sig Dispense Refill     azaTHIOprine (IMURAN) 50 mg tablet TAKE ONE TABLET BY MOUTH THREE TIMES DAILY 270 tablet 0     difluprednate 0.05 % Drop Apply to eye 2 (two) times a day.        prednisoLONE acetate (PRED-FORTE) 1 % ophthalmic suspension Administer 1 drop to both eyes daily.       PREDNISONE ORAL Take 5 mg by mouth.       No current facility-administered medications for this visit.      DETAILED EXAMINATION  11/15/17  :  Vitals:    11/15/17 1408   BP: 100/60   Pulse: 84   Weight: (!) 238 lb (108 kg)   Height: 6' 1.5\" (1.867 m)     Alert oriented. Head including the face is examined for malar rash, heliotropes, scarring, lupus pernio. Eyes examined for redness such as in episcleritis/scleritis, periorbital lesions.   Neck/ Face examined for parotid gland swelling, range of motion of neck.  Left upper and lower and right upper and lower extremities examined for tenderness, swelling, warmth of the appendicular joints, range of motion, edema, rash.  Some of the important findings included: No synovitis in any of the palpable appendicular joints.    Body mass index is 30.97 kg/(m^2).   LAB / IMAGING DATA:  ALT   Date Value Ref Range Status   11/10/2017 24 0 - 45 U/L Final   09/27/2017 15 0 - 45 U/L Final   07/21/2017 21 0 - 45 U/L Final     Albumin   Date Value Ref Range Status   11/10/2017 3.8 3.5 - 5.0 g/dL " Final   09/27/2017 3.5 3.5 - 5.0 g/dL Final   07/21/2017 3.8 3.5 - 5.0 g/dL Final     Creatinine   Date Value Ref Range Status   11/10/2017 0.72 0.70 - 1.30 mg/dL Final   09/27/2017 0.74 0.70 - 1.30 mg/dL Final   07/21/2017 0.73 0.70 - 1.30 mg/dL Final       WBC   Date Value Ref Range Status   11/10/2017 8.3 4.0 - 11.0 thou/uL Final   09/27/2017 8.2 4.0 - 11.0 thou/uL Final       Lab Results   Component Value Date    RF <15.0 02/10/2016    SEDRATE 27 (H) 02/10/2016

## 2021-06-15 ENCOUNTER — RECORDS - HEALTHEAST (OUTPATIENT)
Dept: RHEUMATOLOGY | Facility: CLINIC | Age: 36
End: 2021-06-15

## 2021-06-18 NOTE — PROGRESS NOTES
ASSESSMENT AND PLAN:  Juancarlos Nicholas 32 y.o. male is here for management of immunosuppression.  He is on azathioprine for bilateral uveitis.  He is not on methotrexate because of the reproductive aspects.  He has been to ophthalmology.  He has been able to reduce the frequency of 5 mg prednisone that he takes to alternate days from daily.  He uses drops for his eyes once daily.  We discussed that if there is a need for further intensification of treatment he is going to let us know having discussed this with his ophthalmologist.  He and his wife are very excitedlty awaiting their first born in 3 months.  Otherwise will meet here in 6 months with labs every 3 months.        Diagnoses and all orders for this visit:    Chronic uveitis of both eyes  -     azaTHIOprine (IMURAN) 50 mg tablet; Take 1 tablet (50 mg total) by mouth 3 (three) times a day.  Dispense: 270 tablet; Refill: 0    High risk medication use      HISTORY OF PRESENTING ILLNESS:  Juancarlos Nicholas, 32 y.o., male  is here for evaluation, management of her immunosuppressants for anterior uveitis.  This is bilateral.  Currently he is on azathioprine.  He is tolerated this nicely.  Recent labs are within normal range.  He is on 5 mg of prednisone on alternate days instead of daily.  Is a eyedrops, steroid, are once daily.  This constitutes a combination of blurry vision, painful eyes, photosensitivity.  The eyes get red.  He has put on 15 pounds over it with prednisone.  His recent labs showed hyperglycemia.  He has family history of hypertension.  He reports no mouth ulcers photosensitivity, he has not had pleuritic symptoms, no seizure disorder, no history of erythema nodosum-like symptoms, there is no history of TB in the family or himself, psoriasis, ulcerative colitis Crohn's disease, he describes no low back pain, joint pains.  His workup including HLA-B27, syphilis serology, were reviewed and negative.  He is a  from [facility].   Been here for 4 years.  They got  this past March.  He is a  the [employer].  Exercises regularly treadmill 1 hour day.  He is nonsmoker alcohol once in 3 months.  His father has hypertension.  No family history of autoimmune conditions that he is aware of. Further historical information and ADL limitations as noted in the multidimensional health assessment questionnaire attached in the EMR.       ALLERGIES:Review of patient's allergies indicates no known allergies.    PAST MEDICAL/ACTIVE PROBLEMS/MEDICATION/ FAMILY HISTORY/SOCIAL DATA:  The patient has a family history of  No past medical history on file.  History   Smoking Status     Former Smoker   Smokeless Tobacco     Never Used     Patient Active Problem List   Diagnosis     Chronic uveitis of both eyes     High risk medication use     Current Outpatient Prescriptions   Medication Sig Dispense Refill     azaTHIOprine (IMURAN) 50 mg tablet Take 1 tablet (50 mg total) by mouth 3 (three) times a day. 270 tablet 0     difluprednate 0.05 % Drop Apply to eye 2 (two) times a day.        prednisoLONE acetate (PRED-FORTE) 1 % ophthalmic suspension Administer 1 drop to both eyes daily.       PREDNISONE ORAL Take 5 mg by mouth.       No current facility-administered medications for this visit.      DETAILED EXAMINATION  06/25/18  :  Vitals:    06/25/18 0932   BP: 98/60   Patient Site: Right Arm   Patient Position: Sitting   Cuff Size: Adult Regular   Pulse: 72   Weight: (!) 252 lb (114.3 kg)     Alert oriented. Head including the face is examined for malar rash, heliotropes, scarring, lupus pernio. Eyes examined for redness such as in episcleritis/scleritis, periorbital lesions.   Neck/ Face examined for parotid gland swelling, range of motion of neck.  Left upper and lower and right upper and lower extremities examined for tenderness, swelling, warmth of the appendicular joints, range of motion, edema, rash.  Some of the important findings  included: He does not have synovitis in any of the palpable upper extremity joints no JLT the knees or ankles.         Body mass index is 32.8 kg/(m^2).   LAB / IMAGING DATA:  ALT   Date Value Ref Range Status   06/22/2018 21 0 - 45 U/L Final   05/29/2018 23 0 - 45 U/L Final   02/08/2018 17 0 - 45 U/L Final     Albumin   Date Value Ref Range Status   06/22/2018 3.8 3.5 - 5.0 g/dL Final   05/29/2018 3.7 3.5 - 5.0 g/dL Final   02/08/2018 3.6 3.5 - 5.0 g/dL Final     Creatinine   Date Value Ref Range Status   06/22/2018 0.73 0.70 - 1.30 mg/dL Final   05/29/2018 0.73 0.70 - 1.30 mg/dL Final   02/08/2018 0.68 (L) 0.70 - 1.30 mg/dL Final       WBC   Date Value Ref Range Status   06/22/2018 8.5 4.0 - 11.0 thou/uL Final   05/29/2018 9.6 4.0 - 11.0 thou/uL Final       Lab Results   Component Value Date    RF <15.0 02/10/2016    SEDRATE 27 (H) 02/10/2016

## 2021-06-22 ENCOUNTER — AMBULATORY - HEALTHEAST (OUTPATIENT)
Dept: LAB | Facility: CLINIC | Age: 36
End: 2021-06-22

## 2021-06-22 DIAGNOSIS — H20.13 CHRONIC UVEITIS OF BOTH EYES: ICD-10-CM

## 2021-06-22 LAB
ALBUMIN SERPL-MCNC: 4 G/DL (ref 3.5–5)
ALT SERPL W P-5'-P-CCNC: 26 U/L (ref 0–45)
CREAT SERPL-MCNC: 0.74 MG/DL (ref 0.7–1.3)
ERYTHROCYTE [DISTWIDTH] IN BLOOD BY AUTOMATED COUNT: 13.4 % (ref 11–14.5)
GFR SERPL CREATININE-BSD FRML MDRD: >60 ML/MIN/1.73M2
HCT VFR BLD AUTO: 43.1 % (ref 40–54)
HGB BLD-MCNC: 15.2 G/DL (ref 14–18)
MCH RBC QN AUTO: 33.3 PG (ref 27–34)
MCHC RBC AUTO-ENTMCNC: 35.3 G/DL (ref 32–36)
MCV RBC AUTO: 94 FL (ref 80–100)
PLATELET # BLD AUTO: 383 THOU/UL (ref 140–440)
PMV BLD AUTO: 8.4 FL (ref 7–10)
RBC # BLD AUTO: 4.57 MILL/UL (ref 4.4–6.2)
WBC: 10.6 THOU/UL (ref 4–11)

## 2021-06-22 NOTE — PROGRESS NOTES
ASSESSMENT AND PLAN:  Juancarlos Nicholas 33 y.o. male is here for management of immunosuppression on azathioprine for bilateral uveitis, not associated with of the usual rheumatologic syndrome, still on 5 mg prednisone on alternate days, not started on methotrexate as the family.  We discussed other options including tumor necrosis factor inhibitors.  He is to return for follow-up here in 6 months with labs every 3 months.  He doing good be have anything for me on the Friday          Diagnoses and all orders for this visit:    Chronic uveitis of both eyes  -     azaTHIOprine (IMURAN) 50 mg tablet; Take 1 tablet (50 mg total) by mouth 3 (three) times a day.  Dispense: 270 tablet; Refill: 0    High risk medication use      HISTORY OF PRESENTING ILLNESS:  Juancarlos Nicholas, 33 y.o., male  is here for follow-up of management of immunosuppression for his uveitis, bilateral, on azathioprine at 50 mg daily, has tolerated it well, recent labs are normal.  He still is on prednisone 5 mg on alternate days.  We talked about the option of tumor necrosis factor inhibitors.  He is going to check with his ophthalmologist when he sees them next month in January 2019..  He reports no mouth ulcers photosensitivity, he has not had pleuritic symptoms, no seizure disorder, no history of erythema nodosum-like symptoms, there is no history of TB in the family or himself, psoriasis, ulcerative colitis Crohn's disease, he describes no low back pain, joint pains.  His workup including HLA-B27, syphilis serology, were reviewed and negative.  He is a  from [facility].  Been here for 4 years.  They got  this past March.  He is a  [employer].  Exercises regularly treadmill 1 hour day.  He is nonsmoker alcohol once in 3 months.  His father has hypertension.  No family history of autoimmune conditions that he is aware of. Further historical information and ADL limitations as noted in the multidimensional  health assessment questionnaire attached in the EMR.       ALLERGIES:Patient has no known allergies.    PAST MEDICAL/ACTIVE PROBLEMS/MEDICATION/ FAMILY HISTORY/SOCIAL DATA:  The patient has a family history of  No past medical history on file.  Social History     Tobacco Use   Smoking Status Former Smoker   Smokeless Tobacco Never Used     Patient Active Problem List   Diagnosis     Chronic uveitis of both eyes     High risk medication use     Current Outpatient Medications   Medication Sig Dispense Refill     azaTHIOprine (IMURAN) 50 mg tablet TAKE 1 TABLET BY MOUTH THREE TIMES DAILY 90 tablet 1     difluprednate 0.05 % Drop Apply to eye 2 (two) times a day.        prednisoLONE acetate (PRED-FORTE) 1 % ophthalmic suspension Administer 1 drop to both eyes daily.       PREDNISONE ORAL Take 5 mg by mouth every other day .             azaTHIOprine (IMURAN) 50 mg tablet Take 1 tablet (50 mg total) by mouth 3 (three) times a day. 270 tablet 0     No current facility-administered medications for this visit.      DETAILED EXAMINATION  12/19/18  :  Vitals:    12/19/18 0739   BP: 118/72   Pulse: 64   Weight: (!) 251 lb (113.9 kg)     Alert oriented. Head including the face is examined for malar rash, heliotropes, scarring, lupus pernio. Eyes examined for redness such as in episcleritis/scleritis, periorbital lesions.   Neck/ Face examined for parotid gland swelling, range of motion of neck.  Left upper and lower and right upper and lower extremities examined for tenderness, swelling, warmth of the appendicular joints, range of motion, edema, rash.  Some of the important findings included: He does not have synovitis in any of the palpable upper extremity joints no JLT the knees or ankles.         Body mass index is 32.67 kg/m .   LAB / IMAGING DATA:  ALT   Date Value Ref Range Status   12/17/2018 23 0 - 45 U/L Final   09/25/2018 28 0 - 45 U/L Final   06/22/2018 21 0 - 45 U/L Final     Albumin   Date Value Ref Range Status    12/17/2018 3.8 3.5 - 5.0 g/dL Final   09/25/2018 3.7 3.5 - 5.0 g/dL Final   06/22/2018 3.8 3.5 - 5.0 g/dL Final     Creatinine   Date Value Ref Range Status   12/17/2018 0.69 (L) 0.70 - 1.30 mg/dL Final   09/25/2018 0.65 (L) 0.70 - 1.30 mg/dL Final   06/22/2018 0.73 0.70 - 1.30 mg/dL Final       WBC   Date Value Ref Range Status   12/17/2018 8.6 4.0 - 11.0 thou/uL Final   09/25/2018 8.7 4.0 - 11.0 thou/uL Final       Lab Results   Component Value Date    RF <15.0 02/10/2016    SEDRATE 27 (H) 02/10/2016

## 2021-06-24 ENCOUNTER — RECORDS - HEALTHEAST (OUTPATIENT)
Dept: RHEUMATOLOGY | Facility: CLINIC | Age: 36
End: 2021-06-24

## 2021-06-24 DIAGNOSIS — H20.13 CHRONIC UVEITIS OF BOTH EYES: ICD-10-CM

## 2021-06-24 RX ORDER — AZATHIOPRINE 50 MG/1
50 TABLET ORAL 3 TIMES DAILY
Qty: 270 TABLET | Refills: 0 | Status: SHIPPED | OUTPATIENT
Start: 2021-06-24 | End: 2021-08-02

## 2021-06-26 ENCOUNTER — HEALTH MAINTENANCE LETTER (OUTPATIENT)
Age: 36
End: 2021-06-26

## 2021-06-26 NOTE — TELEPHONE ENCOUNTER
Refilled per [facility] RN refill protocol and Dr Garcia  appt 8/2/21 with Dr Garcia

## 2021-07-21 ENCOUNTER — TRANSFERRED RECORDS (OUTPATIENT)
Dept: HEALTH INFORMATION MANAGEMENT | Facility: CLINIC | Age: 36
End: 2021-07-21

## 2021-08-02 ENCOUNTER — VIRTUAL VISIT (OUTPATIENT)
Dept: RHEUMATOLOGY | Facility: CLINIC | Age: 36
End: 2021-08-02
Payer: COMMERCIAL

## 2021-08-02 DIAGNOSIS — H20.13 CHRONIC UVEITIS OF BOTH EYES: Primary | ICD-10-CM

## 2021-08-02 DIAGNOSIS — Z79.899 HIGH RISK MEDICATION USE: ICD-10-CM

## 2021-08-02 PROCEDURE — 99214 OFFICE O/P EST MOD 30 MIN: CPT | Mod: GT | Performed by: INTERNAL MEDICINE

## 2021-08-02 RX ORDER — AZATHIOPRINE 50 MG/1
50 TABLET ORAL 2 TIMES DAILY
Qty: 180 TABLET | Refills: 0
Start: 2021-08-02 | End: 2021-10-31

## 2021-08-02 NOTE — PROGRESS NOTES
Juancarlos is a 35 year old who is being evaluated via a billable video visit.      How would you like to obtain your AVS? MyChart  If the video visit is dropped, the invitation should be resent by: [phone]  Will anyone else be joining your video visit? No        Video-Visit Details    Type of service:  Video Visit    Start: 08/02/2021 11:52 am   Stop: 08/02/2021 11:58 am    Originating Location (pt. Location): Home    Distant Location (provider location):  [facility]     Platform used for Video Visit: [employer]     This document was created using a software with less than 100% fidelity, at times resulting in unintended, even erroneous syntax and grammar.  The reader is advised to keep this under consideration while reviewing, interpreting this note.           ASSESSMENT AND PLAN:    Diagnoses and all orders for this visit:  High risk medication use  Chronic uveitis of both eyes  -     azaTHIOprine (IMURAN) 50 MG tablet; Take 1 tablet (50 mg) by mouth 2 times daily      Follow up in 6 months      HISTORY OF PRESENTING ILLNESS:  Juancarlos Nicholas 35 year old is evaluated here via video/audio link.  Is here for follow-up.  This is for management of immunosuppression.  This is prescribed for uveitis, bilateral.  4 months ago per his ophthalmologist recommendation he dropped the dose of azathioprine.  He is not taking 100 mg instead of 150 and he continue to do well.  They are planning to meet in weeks.  His most recent labs were done in June hyperabducted to be we discussed the importance of more frequent and regular labs.  Meet here in 6 months.  Stable unless his ophthalmologist wants him to drop further, continue labs every 3 months.       No personal family history of psoriasis ulcerative colitis Crohn's disease. there is no history of TB in the family or himself, psoriasis, ulcerative colitis Crohn's disease, he describes no low back pain, joint pains.  His workup including HLA-B27, syphilis  serology, were reviewed and negative.  He is a  from [facility]].  Been here for 4 years.  They got  this past March.  He is a  [employer].  Exercises regularly treadmill 1 hour day.  He is nonsmoker alcohol once in 3 months.  His father has hypertension.  No family history of autoimmune conditions that he is aware of.  We will continue azathioprine as now, follow-up in 6 months.         ROS enquiry held for fever, ocular symptoms, rash, headache,  GI issues.  Today we also discussed the issues related to the current pandemic, the pros and cons of the current treatment plan, the CDC guidelines such as social distancing washing the hands covering the cough.  ALLERGIES:Patient has no known allergies.    PAST MEDICAL/ACTIVE PROBLEMS/MEDICATION/SOCIAL DATA  No past medical history on file.  History   Smoking Status     Current Some Day Smoker   Smokeless Tobacco     Never Used     Patient Active Problem List   Diagnosis     Chronic uveitis of both eyes     High risk medication use     Current Outpatient Medications   Medication Sig Dispense Refill     azaTHIOprine (IMURAN) 50 mg tablet [AZATHIOPRINE (IMURAN) 50 MG TABLET] Take 1 tablet (50 mg total) by mouth 3 (three) times a day. (Patient taking differently: Take 50 mg by mouth 2 times daily ) 270 tablet 0     azaTHIOprine (IMURAN) 50 mg tablet [AZATHIOPRINE (IMURAN) 50 MG TABLET] Take 1 tablet (50 mg total) by mouth 3 (three) times a day. (Patient not taking: Reported on 8/2/2021) 270 tablet 0     prednisoLONE acetate (PRED-FORTE) 1 % ophthalmic suspension [PREDNISOLONE ACETATE (PRED-FORTE) 1 % OPHTHALMIC SUSPENSION] Administer 1 drop to both eyes daily. (Patient not taking: Reported on 8/2/2021)           EXAMINATION:    Comfortable, alert, oriented, inflammatory changes visible in the sclerae.      LAB / IMAGING DATA:  ALT   Date Value Ref Range Status   06/22/2021 26 0 - 45 U/L Final   10/19/2020 22 0 - 45 U/L Final    06/09/2020 19 0 - 45 U/L Final     Albumin   Date Value Ref Range Status   06/22/2021 4.0 3.5 - 5.0 g/dL Final   10/19/2020 4.3 3.5 - 5.0 g/dL Final   06/09/2020 4.0 3.5 - 5.0 g/dL Final       WBC   Date Value Ref Range Status   06/22/2021 10.6 4.0 - 11.0 thou/uL Final   10/19/2020 9.6 4.0 - 11.0 thou/uL Final     Hemoglobin   Date Value Ref Range Status   06/22/2021 15.2 14.0 - 18.0 g/dL Final   10/19/2020 16.5 14.0 - 18.0 g/dL Final   06/09/2020 15.2 14.0 - 18.0 g/dL Final     Platelet Count   Date Value Ref Range Status   06/22/2021 383 140 - 440 thou/uL Final   10/19/2020 343 140 - 440 thou/uL Final   06/09/2020 335 140 - 440 thou/uL Final       No results found for: JR

## 2021-10-16 ENCOUNTER — HEALTH MAINTENANCE LETTER (OUTPATIENT)
Age: 36
End: 2021-10-16

## 2021-11-24 ENCOUNTER — LAB (OUTPATIENT)
Dept: LAB | Facility: CLINIC | Age: 36
End: 2021-11-24
Payer: COMMERCIAL

## 2021-11-24 ENCOUNTER — TELEPHONE (OUTPATIENT)
Dept: RHEUMATOLOGY | Facility: CLINIC | Age: 36
End: 2021-11-24

## 2021-11-24 DIAGNOSIS — H20.13 CHRONIC UVEITIS OF BOTH EYES: Primary | ICD-10-CM

## 2021-11-24 DIAGNOSIS — H20.13 CHRONIC UVEITIS OF BOTH EYES: ICD-10-CM

## 2021-11-24 LAB
ALBUMIN SERPL-MCNC: 3.5 G/DL (ref 3.4–5)
ALT SERPL W P-5'-P-CCNC: 40 U/L (ref 0–70)
CREAT SERPL-MCNC: 0.62 MG/DL (ref 0.66–1.25)
ERYTHROCYTE [DISTWIDTH] IN BLOOD BY AUTOMATED COUNT: 14.1 % (ref 10–15)
GFR SERPL CREATININE-BSD FRML MDRD: >90 ML/MIN/1.73M2
HCT VFR BLD AUTO: 45.9 % (ref 40–53)
HGB BLD-MCNC: 15.2 G/DL (ref 13.3–17.7)
MCH RBC QN AUTO: 32.2 PG (ref 26.5–33)
MCHC RBC AUTO-ENTMCNC: 33.1 G/DL (ref 31.5–36.5)
MCV RBC AUTO: 97 FL (ref 78–100)
PLATELET # BLD AUTO: 322 10E3/UL (ref 150–450)
RBC # BLD AUTO: 4.72 10E6/UL (ref 4.4–5.9)
WBC # BLD AUTO: 9.4 10E3/UL (ref 4–11)

## 2021-11-24 PROCEDURE — 36415 COLL VENOUS BLD VENIPUNCTURE: CPT

## 2021-11-24 PROCEDURE — 82040 ASSAY OF SERUM ALBUMIN: CPT

## 2021-11-24 PROCEDURE — 85027 COMPLETE CBC AUTOMATED: CPT

## 2021-11-24 PROCEDURE — 82565 ASSAY OF CREATININE: CPT

## 2021-11-24 PROCEDURE — 84460 ALANINE AMINO (ALT) (SGPT): CPT

## 2022-02-15 ENCOUNTER — TRANSFERRED RECORDS (OUTPATIENT)
Dept: HEALTH INFORMATION MANAGEMENT | Facility: CLINIC | Age: 37
End: 2022-02-15
Payer: COMMERCIAL

## 2022-07-17 ENCOUNTER — HEALTH MAINTENANCE LETTER (OUTPATIENT)
Age: 37
End: 2022-07-17

## 2022-09-25 ENCOUNTER — HEALTH MAINTENANCE LETTER (OUTPATIENT)
Age: 37
End: 2022-09-25

## 2023-02-15 ENCOUNTER — TRANSFERRED RECORDS (OUTPATIENT)
Dept: HEALTH INFORMATION MANAGEMENT | Facility: CLINIC | Age: 38
End: 2023-02-15

## 2023-08-05 ENCOUNTER — HEALTH MAINTENANCE LETTER (OUTPATIENT)
Age: 38
End: 2023-08-05

## 2024-09-28 ENCOUNTER — HEALTH MAINTENANCE LETTER (OUTPATIENT)
Age: 39
End: 2024-09-28

## 2025-05-21 ENCOUNTER — TRANSFERRED RECORDS (OUTPATIENT)
Dept: HEALTH INFORMATION MANAGEMENT | Facility: CLINIC | Age: 40
End: 2025-05-21
Payer: COMMERCIAL